# Patient Record
Sex: MALE | Race: WHITE | NOT HISPANIC OR LATINO | URBAN - METROPOLITAN AREA
[De-identification: names, ages, dates, MRNs, and addresses within clinical notes are randomized per-mention and may not be internally consistent; named-entity substitution may affect disease eponyms.]

---

## 2018-10-31 ENCOUNTER — OFFICE VISIT (OUTPATIENT)
Dept: URBAN - METROPOLITAN AREA CLINIC 33 | Facility: CLINIC | Age: 83
End: 2018-10-31
Payer: MEDICARE

## 2018-10-31 DIAGNOSIS — Z96.1 PRESENCE OF INTRAOCULAR LENS: ICD-10-CM

## 2018-10-31 DIAGNOSIS — H35.3131 NONEXUDATIVE AGE-RELATED MACULAR DEGENERATION, BILATERAL, EARLY DRY STAGE: ICD-10-CM

## 2018-10-31 PROCEDURE — 92134 CPTRZ OPH DX IMG PST SGM RTA: CPT | Performed by: OPTOMETRIST

## 2018-10-31 PROCEDURE — 99214 OFFICE O/P EST MOD 30 MIN: CPT | Performed by: OPTOMETRIST

## 2018-10-31 ASSESSMENT — INTRAOCULAR PRESSURE
OD: 16
OS: 17

## 2018-10-31 NOTE — IMPRESSION/PLAN
Impression: Nonexudative age-related macular degeneration, bilateral, early dry stage: H35.3131. Plan: Discussed diagnosis in detail with patient. Discussed treatment options with patient. Use of vitamins has shown to improve the effects of ARMD. Counseling about the benefits and/or risks of the Age-Related Eye Disease Study (AREDS) formulation for preventing progression of age-related macular degeneration (AMD) was provided to the patient and/or caregiver(s). Use of Amsler grid was explained. Will continue to monitor vision and the patient has been instructed to call with any vision changes.

## 2018-10-31 NOTE — IMPRESSION/PLAN
Impression: Type 2 diabetes mellitus w/o complication: N33.7. Plan: Diabetes w/o Complications: No signs of diabetic retinopathy noted. No treatment necessary at this time. Patient was instructed to monitor vision for sudden changes and to call if visual changes noted. Discussed ocular and systemic benefits of blood sugar control. Continue management with PCP. Letter sent to PCP regarding status of ocular health.

## 2019-10-25 ENCOUNTER — OFFICE VISIT (OUTPATIENT)
Dept: URBAN - METROPOLITAN AREA CLINIC 33 | Facility: CLINIC | Age: 84
End: 2019-10-25
Payer: MEDICARE

## 2019-10-25 DIAGNOSIS — H43.813 VITREOUS DEGENERATION, BILATERAL: ICD-10-CM

## 2019-10-25 PROCEDURE — 99214 OFFICE O/P EST MOD 30 MIN: CPT | Performed by: OPTOMETRIST

## 2019-10-25 PROCEDURE — 92250 FUNDUS PHOTOGRAPHY W/I&R: CPT | Performed by: OPTOMETRIST

## 2019-10-25 ASSESSMENT — KERATOMETRY
OD: 41.00
OS: 42.75

## 2019-10-25 ASSESSMENT — INTRAOCULAR PRESSURE
OD: 15
OS: 16

## 2019-10-25 NOTE — IMPRESSION/PLAN
Impression: Type 2 diabetes mellitus w/o complication: C59.3. Plan: Diabetes w/o Complications: No signs of diabetic retinopathy noted. No treatment necessary at this time. Patient was instructed to monitor vision for sudden changes and to call if visual changes noted. Discussed ocular and systemic benefits of blood sugar control. Continue management with PCP. Letter sent to PCP regarding status of ocular health.

## 2019-10-25 NOTE — IMPRESSION/PLAN
Impression: Exudative age-related macular degeneration of right eye w/ active choroidal neovascularization: H35.9778. Plan: Discussed diagnosis in detail with patient. Discussed treatment options with patient. Use of Amsler grid was explained. Will continue to monitor vision and the patient has been instructed to call with any vision changes. Large retinal scar with subretinal bleed inferior to macula, foveal sparing. Will refer the patient to a retinal specialist for evaluation and management Ordered and reviewed Fundus photos.

## 2019-10-31 ENCOUNTER — OFFICE VISIT (OUTPATIENT)
Dept: URBAN - METROPOLITAN AREA CLINIC 33 | Facility: CLINIC | Age: 84
End: 2019-10-31
Payer: MEDICARE

## 2019-10-31 DIAGNOSIS — E11.9 TYPE 2 DIABETES MELLITUS W/O COMPLICATION: ICD-10-CM

## 2019-10-31 DIAGNOSIS — H35.3121 NEXDTVE AGE-RELATED MCLR DEGN, LEFT EYE, EARLY DRY STAGE: ICD-10-CM

## 2019-10-31 PROCEDURE — 67028 INJECTION EYE DRUG: CPT | Performed by: OPHTHALMOLOGY

## 2019-10-31 PROCEDURE — 92004 COMPRE OPH EXAM NEW PT 1/>: CPT | Performed by: OPHTHALMOLOGY

## 2019-10-31 PROCEDURE — 92134 CPTRZ OPH DX IMG PST SGM RTA: CPT | Performed by: OPHTHALMOLOGY

## 2019-10-31 ASSESSMENT — INTRAOCULAR PRESSURE
OD: 14
OS: 13

## 2019-10-31 NOTE — IMPRESSION/PLAN
Impression: Nexdtve age-related mclr degn, left eye, early dry stage: H35.3121. OS. Condition: stable. Vision: vision not affected. Plan: Discussed diagnosis in detail with patient. No treatment is required at this time based on exam and OCT. Recommend observation for now. Will reassess condition in 6 wks. OCT shows no obvious fluid in the center OS.

## 2019-10-31 NOTE — IMPRESSION/PLAN
Impression: Exdtve age-rel mclr degn, right eye, with actv chrdl neovas: H35.3211. OD. Condition: unstable. Vision: vision affected. Plan: Discussed diagnosis in detail with patient. Discussed risks of progression. Based on today's exam, diagnostic studies and review of records, recommend to start Intravitreal Injection Treatment to help reduce the fluid in order to prevent a further reduction in vision. An examination that was significantly and separately identifiable from the procedure was performed today. Discussed the risks and benefits of tx. Patient elects to proceed with recommendation. OCT shows no obvious fluid in the center OD.

## 2019-10-31 NOTE — IMPRESSION/PLAN
Impression: Type 2 diabetes mellitus w/o complication: F91.3. OU. Condition: stable. Plan: Discussed diagnosis in detail with patient. Exam shows no Diabetic changes OU. No treatment is recommended at this time. Emphasized blood sugar control and advised to keep future appointments with PCP and/or Endocrinologist for the management of Diabetes. Recommend observation for now. OCT shows no obvious fluid in the center OU.

## 2019-11-08 ENCOUNTER — OFFICE VISIT (OUTPATIENT)
Dept: URBAN - METROPOLITAN AREA CLINIC 33 | Facility: CLINIC | Age: 84
End: 2019-11-08
Payer: MEDICARE

## 2019-11-08 PROCEDURE — 99213 OFFICE O/P EST LOW 20 MIN: CPT | Performed by: OPTOMETRIST

## 2019-11-08 ASSESSMENT — INTRAOCULAR PRESSURE
OD: 13
OS: 14

## 2019-11-08 NOTE — IMPRESSION/PLAN
Impression: Exdtve age-rel mclr degn, right eye, with actv chrdl neovas: H35.3211. OD. Condition: unstable. Vision: vision affected. Plan: 1 week s/p Avastin Injection OD. IOP WNL, no pain, or inflammation.  Keep appt w/ Dr. Shanelle Mcneill

## 2019-12-12 ENCOUNTER — OFFICE VISIT (OUTPATIENT)
Dept: URBAN - METROPOLITAN AREA CLINIC 33 | Facility: CLINIC | Age: 84
End: 2019-12-12
Payer: MEDICARE

## 2019-12-12 PROCEDURE — 67028 INJECTION EYE DRUG: CPT | Performed by: OPHTHALMOLOGY

## 2019-12-12 PROCEDURE — 92134 CPTRZ OPH DX IMG PST SGM RTA: CPT | Performed by: OPHTHALMOLOGY

## 2019-12-12 PROCEDURE — 99213 OFFICE O/P EST LOW 20 MIN: CPT | Performed by: OPHTHALMOLOGY

## 2019-12-12 ASSESSMENT — INTRAOCULAR PRESSURE
OS: 14
OD: 15

## 2019-12-12 NOTE — IMPRESSION/PLAN
Impression: Exdtve age-rel mclr degn, right eye, with actv chrdl neovas: H35.3211. OD. Condition: improving. Vision: vision improved. s/p AV OD #1 10/31/2019. Plan: Discussed diagnosis in detail with patient. Exam OD shows no obvious fluid or heme and OCT OD shows a decrease in CMT. Discussed risks of progression. Based on today's exam, diagnostic studies and review of records, recommend to continue with LING tx to help stabilize condition in order to prevent a further reduction in vision. An examination that was significantly and separately identifiable from the procedure was performed today. Discussed the risks and benefits of tx. Patient elects to proceed with recommendation.

## 2019-12-12 NOTE — IMPRESSION/PLAN
Impression: Type 2 diabetes mellitus w/o complication: S10.3. OU. Condition: stable. Plan: Discussed diagnosis in detail with patient. Exam shows no Diabetic changes OU. No treatment is recommended at this time. Emphasized blood sugar control and advised to keep future appointments with PCP and/or Endocrinologist for the management of Diabetes. Recommend observation for now.

## 2019-12-12 NOTE — IMPRESSION/PLAN
Impression: Nexdtve age-related mclr degn, left eye, early dry stage: H35.3121. OS. Condition: stable. Vision: vision not affected. Plan: Discussed diagnosis in detail with patient. No treatment is required at this time based on exam and OCT. Recommend observation for now. Will reassess condition in 6 wks. OCT shows no IRF or SRF OS.

## 2020-01-09 ENCOUNTER — OFFICE VISIT (OUTPATIENT)
Dept: URBAN - METROPOLITAN AREA CLINIC 33 | Facility: CLINIC | Age: 85
End: 2020-01-09
Payer: MEDICARE

## 2020-01-09 DIAGNOSIS — H35.3211 EXDTVE AGE-REL MCLR DEGN, RIGHT EYE, WITH ACTV CHRDL NEOVAS: Primary | ICD-10-CM

## 2020-01-09 PROCEDURE — 92134 CPTRZ OPH DX IMG PST SGM RTA: CPT | Performed by: OPHTHALMOLOGY

## 2020-01-09 PROCEDURE — 99213 OFFICE O/P EST LOW 20 MIN: CPT | Performed by: OPHTHALMOLOGY

## 2020-01-09 ASSESSMENT — INTRAOCULAR PRESSURE
OS: 12
OD: 13

## 2020-01-09 NOTE — IMPRESSION/PLAN
Impression: Type 2 diabetes mellitus w/o complication: U97.0. OU. Condition: stable. Plan: Discussed diagnosis in detail with patient. Exam shows no Diabetic changes OU. No treatment is recommended at this time. Emphasized blood sugar control and advised to keep future appointments with PCP and/or Endocrinologist for the management of Diabetes. Recommend observation for now.

## 2020-01-09 NOTE — IMPRESSION/PLAN
Impression: Exdtve age-rel mclr degn, right eye, with actv chrdl neovas: H35.3211. OD. Condition: improving. Vision: vision improved. s/p AV OD #212/12/19, #1 10/31/2019. Plan: Discussed diagnosis in detail with patient. No treatment is required at this time based on exam and OCT. Recommend observation for now. Will reassess condition in 4 wks.  OCT shows no obvious leakage

## 2020-01-09 NOTE — IMPRESSION/PLAN
Impression: Nexdtve age-related mclr degn, left eye, early dry stage: H35.3121. OS. Condition: stable. Vision: vision not affected. Plan: Discussed diagnosis in detail with patient. No treatment is required at this time based on exam and OCT. Recommend observation for now. Will reassess condition in 4 wks. OCT shows no IRF or SRF OS.